# Patient Record
Sex: FEMALE | Race: WHITE | NOT HISPANIC OR LATINO | URBAN - METROPOLITAN AREA
[De-identification: names, ages, dates, MRNs, and addresses within clinical notes are randomized per-mention and may not be internally consistent; named-entity substitution may affect disease eponyms.]

---

## 2024-03-01 ENCOUNTER — APPOINTMENT (OUTPATIENT)
Dept: PRIMARY CARE | Facility: CLINIC | Age: 59
End: 2024-03-01
Payer: COMMERCIAL

## 2024-03-15 ENCOUNTER — LAB (OUTPATIENT)
Dept: LAB | Facility: LAB | Age: 59
End: 2024-03-15
Payer: COMMERCIAL

## 2024-03-15 ENCOUNTER — OFFICE VISIT (OUTPATIENT)
Dept: PRIMARY CARE | Facility: CLINIC | Age: 59
End: 2024-03-15
Payer: COMMERCIAL

## 2024-03-15 VITALS
HEART RATE: 69 BPM | HEIGHT: 69 IN | TEMPERATURE: 97.2 F | SYSTOLIC BLOOD PRESSURE: 112 MMHG | BODY MASS INDEX: 30.45 KG/M2 | OXYGEN SATURATION: 96 % | WEIGHT: 205.6 LBS | DIASTOLIC BLOOD PRESSURE: 80 MMHG

## 2024-03-15 DIAGNOSIS — Z13.228 SCREENING FOR METABOLIC DISORDER: ICD-10-CM

## 2024-03-15 DIAGNOSIS — Z11.59 NEED FOR HEPATITIS C SCREENING TEST: ICD-10-CM

## 2024-03-15 DIAGNOSIS — Z00.00 ROUTINE HEALTH MAINTENANCE: ICD-10-CM

## 2024-03-15 DIAGNOSIS — Z78.0 POST-MENOPAUSE: ICD-10-CM

## 2024-03-15 DIAGNOSIS — Z12.31 BREAST CANCER SCREENING BY MAMMOGRAM: ICD-10-CM

## 2024-03-15 DIAGNOSIS — Z12.11 COLON CANCER SCREENING: ICD-10-CM

## 2024-03-15 DIAGNOSIS — Z76.89 ENCOUNTER TO ESTABLISH CARE WITH NEW DOCTOR: Primary | ICD-10-CM

## 2024-03-15 LAB
ALBUMIN SERPL-MCNC: 4.4 G/DL (ref 3.5–5)
ALP BLD-CCNC: 76 U/L (ref 35–125)
ALT SERPL-CCNC: 11 U/L (ref 5–40)
ANION GAP SERPL CALC-SCNC: 13 MMOL/L
AST SERPL-CCNC: 15 U/L (ref 5–40)
BILIRUB SERPL-MCNC: 0.5 MG/DL (ref 0.1–1.2)
BUN SERPL-MCNC: 15 MG/DL (ref 8–25)
CALCIUM SERPL-MCNC: 9.3 MG/DL (ref 8.5–10.4)
CHLORIDE SERPL-SCNC: 101 MMOL/L (ref 97–107)
CHOLEST SERPL-MCNC: 246 MG/DL (ref 133–200)
CHOLEST/HDLC SERPL: 3.7 {RATIO}
CO2 SERPL-SCNC: 26 MMOL/L (ref 24–31)
CREAT SERPL-MCNC: 0.8 MG/DL (ref 0.4–1.6)
EGFRCR SERPLBLD CKD-EPI 2021: 84 ML/MIN/1.73M*2
ERYTHROCYTE [DISTWIDTH] IN BLOOD BY AUTOMATED COUNT: 12.9 % (ref 11.5–14.5)
EST. AVERAGE GLUCOSE BLD GHB EST-MCNC: 108 MG/DL
GLUCOSE SERPL-MCNC: 85 MG/DL (ref 65–99)
HBA1C MFR BLD: 5.4 %
HCT VFR BLD AUTO: 41.4 % (ref 36–46)
HCV AB SER QL: NONREACTIVE
HDLC SERPL-MCNC: 66 MG/DL
HGB BLD-MCNC: 13.4 G/DL (ref 12–16)
INSULIN P FAST SERPL-ACNC: 5 UIU/ML (ref 3–25)
LDLC SERPL CALC-MCNC: 159 MG/DL (ref 65–130)
MCH RBC QN AUTO: 29.6 PG (ref 26–34)
MCHC RBC AUTO-ENTMCNC: 32.4 G/DL (ref 32–36)
MCV RBC AUTO: 92 FL (ref 80–100)
NRBC BLD-RTO: 0 /100 WBCS (ref 0–0)
PLATELET # BLD AUTO: 241 X10*3/UL (ref 150–450)
POTASSIUM SERPL-SCNC: 4 MMOL/L (ref 3.4–5.1)
PROT SERPL-MCNC: 7 G/DL (ref 5.9–7.9)
RBC # BLD AUTO: 4.52 X10*6/UL (ref 4–5.2)
SODIUM SERPL-SCNC: 140 MMOL/L (ref 133–145)
TRIGL SERPL-MCNC: 105 MG/DL (ref 40–150)
TSH SERPL DL<=0.05 MIU/L-ACNC: 1.8 MIU/L (ref 0.27–4.2)
WBC # BLD AUTO: 5.1 X10*3/UL (ref 4.4–11.3)

## 2024-03-15 PROCEDURE — 80053 COMPREHEN METABOLIC PANEL: CPT

## 2024-03-15 PROCEDURE — 36415 COLL VENOUS BLD VENIPUNCTURE: CPT

## 2024-03-15 PROCEDURE — 83525 ASSAY OF INSULIN: CPT

## 2024-03-15 PROCEDURE — 80061 LIPID PANEL: CPT

## 2024-03-15 PROCEDURE — 99386 PREV VISIT NEW AGE 40-64: CPT | Performed by: FAMILY MEDICINE

## 2024-03-15 PROCEDURE — 84443 ASSAY THYROID STIM HORMONE: CPT

## 2024-03-15 PROCEDURE — 83036 HEMOGLOBIN GLYCOSYLATED A1C: CPT

## 2024-03-15 PROCEDURE — 85027 COMPLETE CBC AUTOMATED: CPT

## 2024-03-15 PROCEDURE — 1036F TOBACCO NON-USER: CPT | Performed by: FAMILY MEDICINE

## 2024-03-15 PROCEDURE — 86803 HEPATITIS C AB TEST: CPT

## 2024-03-15 ASSESSMENT — PATIENT HEALTH QUESTIONNAIRE - PHQ9
SUM OF ALL RESPONSES TO PHQ9 QUESTIONS 1 AND 2: 0
1. LITTLE INTEREST OR PLEASURE IN DOING THINGS: NOT AT ALL
2. FEELING DOWN, DEPRESSED OR HOPELESS: NOT AT ALL

## 2024-03-15 ASSESSMENT — PAIN SCALES - GENERAL: PAINLEVEL: 0-NO PAIN

## 2024-03-15 ASSESSMENT — LIFESTYLE VARIABLES: HOW MANY STANDARD DRINKS CONTAINING ALCOHOL DO YOU HAVE ON A TYPICAL DAY: PATIENT DOES NOT DRINK

## 2024-03-15 NOTE — PATIENT INSTRUCTIONS
Problem List Items Addressed This Visit    None  Visit Diagnoses         Codes    Encounter to establish care with new doctor    -  Primary Z76.89    Routine health maintenance     Z00.00    Relevant Orders    Comprehensive Metabolic Panel    Lipid Panel    CBC    TSH with reflex to Free T4 if abnormal    Hepatitis C Antibody    BI mammo bilateral screening tomosynthesis    XR DEXA bone density    Need for hepatitis C screening test     Z11.59    Relevant Orders    Hepatitis C Antibody    Colon cancer screening     Z12.11    Relevant Orders    Referral to Gastroenterology    Breast cancer screening by mammogram     Z12.31    Relevant Orders    BI mammo bilateral screening tomosynthesis    Post-menopause     Z78.0    Relevant Orders    XR DEXA bone density            Additional Visit Plans:  - Complete history and physical examination was performed      GENERAL RECOMMENDATIONS:  - Provided you with handouts on healthy eating, including the Top Ten Tips for a Healthy Diet  - I encourage you to eat a low-fat, moderate-carbohydrate, low-calorie diet to maintain a normal BMI (under 25) to reduce heart disease, and risk for diabetes  - Moderate intensity exercise for 30 minutes 5 days per week is recommended  - Along with recommendations for nutrition and exercise discussed today helpful resources recommended by the American Academy of Family Practice can be found at www.familydoctor.org or www.choosemyplate.gov  - Can also consider enrolling in a program such as Weight Watchers or Kiesha Dudleyig. The most effective diet is going to be one you can follow long term and turn into a lifestyle  - I recommend a routine vision check and dental cleanings every 6 months      BLOOD TESTING:  - We will obtain routine blood work, please have this done when you are fasting. The office will notify you of the test results once they are available and make any treatment recommendations accordingly  - Blood work may include a cholesterol and  diabetes screen if risk factors exist (overweight, high blood pressure etc); screening for sexually transmitted infections; and a one time Hepatitis C Virus screen for those born between 3568-4104.      VACCINATION RECOMMENDATIONS:  - Flu shot annually. Declined  - Tetanus booster every 10 years. Up to date, next due 2030  - Two pneumonia vaccinations starting at 65 years old (or earlier if risk factors - smoker, diabetic, heart or lung conditions) - not due yet.  - Shingles vaccine for those 50 years or older - due, will come back for this.       SCREENING RECOMMENDATIONS:  - Cervical cancer screening (pap test) in women starting at age 21 until age 65 years old. Up to date, consider seeing gynecology next year  - Mammogram screening for breast cancer in women starting at 40-50 years and every 1-2 years - not due yet. Order placed  - Bone density screening (DEXA) for osteoporosis in women aged 65 years and older (in younger women who are higher risk) - order placed  - Colon cancer screening (with colonoscopy or Cologuard) for men and women starting at age 45 until 74 years old (or earlier if family history of colon cancer) - due, referral placed      Next Wellness Exam/Annual Physical Due  In 1 year from today    Patient Care Team:  Adrien Johnson DO as PCP - General (Family Medicine)  Aleksandar CONLEY DO as PCP - [facility] P[facility]    Adrien Johnson DO   03/15/24   11:27 AM

## 2024-03-15 NOTE — PROGRESS NOTES
Outpatient Visit Note    Chief Complaint   Patient presents with    Annual Exam     NP Physical with employer physical form.       HPI:  Chantale Ferrer is a 59 y.o. female here  for a complete physical and to establish care.    Health Maintenance.  Immunizations: Tdap vaccine received 3-4 years ago. Next due 2030.  Influenza vaccine, declined.  Shingles vaccine is due, will come back for this.     Denies smoking or illicit drug use, drinks 0 alcoholic beverages a week. Patient reports routine vision checks and dental cleanings, and regular exercise with walking. Patient is fasting for routine blood work today.    Screening colonoscopy is due. Screening pap done 3 years ago with gynecology, screening mammogram is due at the end of the month.     Otherwise denies fevers, chills, cold/flu symptoms, SOB, CP, N/V, abdominal pain, dysuria, hematuria, melena, diarrhea or LE edema    PHQ9/GAD7:         There are no problems to display for this patient.       Past Medical History:   Diagnosis Date    Acute atopic conjunctivitis, left eye 03/22/2017    Allergic conjunctivitis of left eye    Benign paroxysmal vertigo, left ear 01/17/2016    Benign positional vertigo, left    Headache, unspecified 06/02/2022    Sinus headache    Palmar fascial fibromatosis (dupuytren) 02/05/2016    Dupuytren contracture    Sprain of unspecified site of right knee, initial encounter 07/10/2022    Sprain of right knee, unspecified ligament, initial encounter    Toxic effect of venom of other arthropod, accidental (unintentional), initial encounter 06/14/2019    Local reaction to insect sting    Unspecified injury of right lower leg, initial encounter 07/10/2022    Right knee injury        Current Medications  No current outpatient medications     Allergies  No Known Allergies     Immunizations  Immunization History   Administered Date(s) Administered    Influenza, Unspecified 10/09/2020    Pfizer Purple Cap SARS-CoV-2 03/26/2021,  04/16/2021        History reviewed. No pertinent surgical history.  Family History   Problem Relation Name Age of Onset    No Known Problems Mother      No Known Problems Brother      No Known Problems Brother      No Known Problems Daughter      No Known Problems Daughter      No Known Problems Son       Social History     Tobacco Use    Smoking status: Never    Smokeless tobacco: Never   Vaping Use    Vaping Use: Never used   Substance Use Topics    Alcohol use: Never    Drug use: Never     Tobacco Use: Low Risk  (3/15/2024)    Patient History     Smoking Tobacco Use: Never     Smokeless Tobacco Use: Never     Passive Exposure: Not on file        ROS  All pertinent positive symptoms are included in the history of present illness.  All other systems have been reviewed and are negative and noncontributory to this patient's current ailments.    VITAL SIGNS  Vitals:    03/15/24 1107   BP: 112/80   Pulse: 69   Temp: 36.2 °C (97.2 °F)   SpO2: 96%     Vitals:    03/15/24 1107   Weight: 93.3 kg (205 lb 9.6 oz)      Body mass index is 30.36 kg/m².     PHYSICAL EXAM  GENERAL APPEARANCE: well nourished, well developed, looks like stated age, in no acute distress, not ill or tired appearing, conversing well.   HEENT: no trauma, normocephalic. PERRLA and EOMI with normal external exam. TM's intact with no injection or effusion, no signs of infection.   NECK: no nodes, supple without rigidity, no neck mass was observed, no thyromegaly or thyroid nodules.   HEART: regular rate and rhythm, S1 and S2 heard with no murmurs or skipped beats, no carotid bruits.   LUNGS: clear to auscultation bilaterally with no wheezes, crackles or rales.   ABDOMEN: no organomegaly, soft, nontender, nondistended, normal bowel sounds, no guarding/rebound/rigidity.   EXTREMITIES: moving all extremities equally with no edema or deformities.   SKIN: normal skin color and pigmentation, normal skin turgor without rash, lesions, or nodules visualized.    NEUROLOGIC EXAM: CN II-XII grossly intact, normal gait, normal balance, 5/5 muscle strength  PSYCH: mood and affect appropriate; alert and oriented to time, place, person; no difficulty with speech or language.   LYMPH NODES: no cervical lymphadenopathy.           Assessment/Plan   Problem List Items Addressed This Visit    None  Visit Diagnoses         Codes    Encounter to establish care with new doctor    -  Primary Z76.89    Routine health maintenance     Z00.00    Relevant Orders    Comprehensive Metabolic Panel (Completed)    Lipid Panel (Completed)    CBC (Completed)    TSH with reflex to Free T4 if abnormal (Completed)    Hepatitis C Antibody (Completed)    BI mammo bilateral screening tomosynthesis    XR DEXA bone density    Insulin, Fasting (Completed)    Hemoglobin A1c (Completed)    Need for hepatitis C screening test     Z11.59    Relevant Orders    Hepatitis C Antibody (Completed)    Colon cancer screening     Z12.11    Relevant Orders    Referral to Gastroenterology    Breast cancer screening by mammogram     Z12.31    Relevant Orders    BI mammo bilateral screening tomosynthesis    Post-menopause     Z78.0    Relevant Orders    XR DEXA bone density    Screening for metabolic disorder     Z13.228    Relevant Orders    Insulin, Fasting (Completed)    Hemoglobin A1c (Completed)            Additional Visit Plans:  - Complete history and physical examination was performed      GENERAL RECOMMENDATIONS:  - Provided you with handouts on healthy eating, including the Top Ten Tips for a Healthy Diet  - I encourage you to eat a low-fat, moderate-carbohydrate, low-calorie diet to maintain a normal BMI (under 25) to reduce heart disease, and risk for diabetes  - Moderate intensity exercise for 30 minutes 5 days per week is recommended  - Along with recommendations for nutrition and exercise discussed today helpful resources recommended by the American Academy of Family Practice can be found at  www.familydoctor.org or www.choosemyplate.gov  - Can also consider enrolling in a program such as Weight Watchers or Kiesha Pitts. The most effective diet is going to be one you can follow long term and turn into a lifestyle  - I recommend a routine vision check and dental cleanings every 6 months      BLOOD TESTING:  - We will obtain routine blood work, please have this done when you are fasting. The office will notify you of the test results once they are available and make any treatment recommendations accordingly  - Blood work may include a cholesterol and diabetes screen if risk factors exist (overweight, high blood pressure etc); screening for sexually transmitted infections; and a one time Hepatitis C Virus screen for those born between 3097-4935.      VACCINATION RECOMMENDATIONS:  - Flu shot annually. Declined  - Tetanus booster every 10 years. Up to date, next due 2030  - Two pneumonia vaccinations starting at 65 years old (or earlier if risk factors - smoker, diabetic, heart or lung conditions) - not due yet.  - Shingles vaccine for those 50 years or older - due, will come back for this.       SCREENING RECOMMENDATIONS:  - Cervical cancer screening (pap test) in women starting at age 21 until age 65 years old. Up to date, consider seeing gynecology next year  - Mammogram screening for breast cancer in women starting at 40-50 years and every 1-2 years - not due yet. Order placed  - Bone density screening (DEXA) for osteoporosis in women aged 65 years and older (in younger women who are higher risk) - order placed  - Colon cancer screening (with colonoscopy or Cologuard) for men and women starting at age 45 until 74 years old (or earlier if family history of colon cancer) - due, referral placed      Next Wellness Exam/Annual Physical Due  In 1 year from today    Patient Care Team:  Adrien Johnson DO as PCP - General (Family Medicine)  Aleksandar CONLEY DO as PCP - [facility] P[facility]    Adrien Johnson DO    03/19/24   9:42 AM